# Patient Record
Sex: MALE | URBAN - METROPOLITAN AREA
[De-identification: names, ages, dates, MRNs, and addresses within clinical notes are randomized per-mention and may not be internally consistent; named-entity substitution may affect disease eponyms.]

---

## 2023-06-15 ENCOUNTER — APPOINTMENT (RX ONLY)
Dept: URBAN - METROPOLITAN AREA CLINIC 42 | Facility: CLINIC | Age: 23
Setting detail: DERMATOLOGY
End: 2023-06-15

## 2023-06-15 DIAGNOSIS — L20.89 OTHER ATOPIC DERMATITIS: ICD-10-CM | Status: WORSENING

## 2023-06-15 PROCEDURE — ? PRESCRIPTION

## 2023-06-15 PROCEDURE — ? PRESCRIPTION MEDICATION MANAGEMENT

## 2023-06-15 PROCEDURE — 99204 OFFICE O/P NEW MOD 45 MIN: CPT

## 2023-06-15 PROCEDURE — ? DIAGNOSIS COMMENT

## 2023-06-15 PROCEDURE — ? COUNSELING

## 2023-06-15 RX ORDER — TRIAMCINOLONE ACETONIDE 1 MG/G
CREAM TOPICAL
Qty: 453.6 | Refills: 0 | Status: ERX | COMMUNITY
Start: 2023-06-15

## 2023-06-15 RX ORDER — DESONIDE 0.5 MG/G
CREAM TOPICAL
Qty: 60 | Refills: 0 | Status: ERX | COMMUNITY
Start: 2023-06-15

## 2023-06-15 RX ORDER — HYDROXYZINE HYDROCHLORIDE 10 MG/1
TABLET, FILM COATED ORAL QHS
Qty: 30 | Refills: 1 | Status: ERX | COMMUNITY
Start: 2023-06-15

## 2023-06-15 RX ADMIN — TRIAMCINOLONE ACETONIDE: 1 CREAM TOPICAL at 00:00

## 2023-06-15 RX ADMIN — DESONIDE: 0.5 CREAM TOPICAL at 00:00

## 2023-06-15 RX ADMIN — HYDROXYZINE HYDROCHLORIDE: 10 TABLET, FILM COATED ORAL at 00:00

## 2023-06-15 ASSESSMENT — LOCATION SIMPLE DESCRIPTION DERM
LOCATION SIMPLE: SUPERIOR FOREHEAD
LOCATION SIMPLE: RIGHT UPPER BACK
LOCATION SIMPLE: LEFT BUTTOCK
LOCATION SIMPLE: RIGHT UPPER ARM
LOCATION SIMPLE: RIGHT BUTTOCK
LOCATION SIMPLE: LEFT UPPER ARM

## 2023-06-15 ASSESSMENT — LOCATION DETAILED DESCRIPTION DERM
LOCATION DETAILED: RIGHT SUPERIOR UPPER BACK
LOCATION DETAILED: LEFT BUTTOCK
LOCATION DETAILED: LEFT ANTERIOR PROXIMAL UPPER ARM
LOCATION DETAILED: SUPERIOR MID FOREHEAD
LOCATION DETAILED: RIGHT ANTERIOR PROXIMAL UPPER ARM
LOCATION DETAILED: RIGHT BUTTOCK

## 2023-06-15 ASSESSMENT — BSA ECZEMA: % BODY COVERED IN ECZEMA: 68

## 2023-06-15 ASSESSMENT — LOCATION ZONE DERM
LOCATION ZONE: FACE
LOCATION ZONE: ARM
LOCATION ZONE: TRUNK

## 2023-06-15 NOTE — PROCEDURE: DIAGNOSIS COMMENT
Detail Level: Simple
Render Risk Assessment In Note?: no
Comment: Eczema started 2 years ago.\\nPreviously saw outside dermatologist and was prescribed Desonide ointment and Fluocinonide ointment - some relief.  \\nPt was previously admitted to ER due to severe flaring and infection - rx'd oral steroids, pain medications, and oral antibiotics. Pt reports rash got worse after taking oral steroids. \\nPt reports severe itching, burning, and pain - pt is unable to work due to symptoms.\\nDiscussed rare disorders like ctcl \\n\\nUpon examination, findings are consistent with eczema and not psoriasis. \\n\\nNo family hx of skin cancer. \\nNo hx of childhood eczema. \\nDenies recent illnesses. \\n\\nDisc R/B/SE of Dupixent - pt is currently uninsured.\\nWill discuss case with biologic coordinator. \\n\\nDisc gentle skin care routine. \\nStart Triamcinolone cream, Desonide cream, and Hydroxyzine 10mg tablets.\\n\\nFollow up in 6 weeks.

## 2023-06-15 NOTE — PROCEDURE: PRESCRIPTION MEDICATION MANAGEMENT
Render In Strict Bullet Format?: No
Initiate Treatment: - Triamcinolone acetonide 0.1 % topical cream: Apply to the affected areas of the extremities and trunk BID for 2 weeks, then take a 2 week break. Repeat as needed for flares. Avoid face, groin, and axillae.\\n\\n- Desonide 0.05 % topical cream: Apply a thin layer to affected areas on face, neck, and groin BID x2 weeks, then take a 2 week break. Repeat as needed for flares.\\n\\n- Hydroxyzine HCl 10 mg tablet: Take one pill once daily at bedtime prn itch.
Detail Level: Zone

## 2023-07-27 ENCOUNTER — APPOINTMENT (RX ONLY)
Dept: URBAN - METROPOLITAN AREA CLINIC 42 | Facility: CLINIC | Age: 23
Setting detail: DERMATOLOGY
End: 2023-07-27

## 2023-07-27 DIAGNOSIS — L20.89 OTHER ATOPIC DERMATITIS: ICD-10-CM

## 2023-07-27 PROCEDURE — 99214 OFFICE O/P EST MOD 30 MIN: CPT

## 2023-07-27 PROCEDURE — ? COUNSELING

## 2023-07-27 PROCEDURE — ? PRESCRIPTION MEDICATION MANAGEMENT

## 2023-07-27 PROCEDURE — ? PRESCRIPTION

## 2023-07-27 PROCEDURE — ? DIAGNOSIS COMMENT

## 2023-07-27 RX ORDER — TACROLIMUS 1 MG/G
OINTMENT TOPICAL
Qty: 100 | Refills: 2 | Status: ERX | COMMUNITY
Start: 2023-07-27

## 2023-07-27 RX ORDER — HYDROXYZINE HYDROCHLORIDE 10 MG/1
TABLET, FILM COATED ORAL QHS
Qty: 30 | Refills: 5 | Status: ERX

## 2023-07-27 RX ADMIN — TACROLIMUS: 1 OINTMENT TOPICAL at 00:00

## 2023-07-27 ASSESSMENT — LOCATION SIMPLE DESCRIPTION DERM
LOCATION SIMPLE: LEFT BUTTOCK
LOCATION SIMPLE: RIGHT UPPER ARM
LOCATION SIMPLE: RIGHT UPPER BACK
LOCATION SIMPLE: RIGHT BUTTOCK
LOCATION SIMPLE: SUPERIOR FOREHEAD
LOCATION SIMPLE: LEFT UPPER ARM

## 2023-07-27 ASSESSMENT — LOCATION DETAILED DESCRIPTION DERM
LOCATION DETAILED: SUPERIOR MID FOREHEAD
LOCATION DETAILED: LEFT ANTERIOR PROXIMAL UPPER ARM
LOCATION DETAILED: LEFT BUTTOCK
LOCATION DETAILED: RIGHT BUTTOCK
LOCATION DETAILED: RIGHT SUPERIOR UPPER BACK
LOCATION DETAILED: RIGHT ANTERIOR PROXIMAL UPPER ARM

## 2023-07-27 ASSESSMENT — LOCATION ZONE DERM
LOCATION ZONE: ARM
LOCATION ZONE: FACE
LOCATION ZONE: TRUNK

## 2023-07-27 NOTE — PROCEDURE: PRESCRIPTION MEDICATION MANAGEMENT
Render In Strict Bullet Format?: No
Initiate Treatment: - Triamcinolone acetonide 0.1 % topical cream: Apply to the affected areas of the extremities and trunk BID for 2 weeks, then take a 2 week break. Repeat as needed for flares. Avoid face, groin, and axillae.\\n\\n- Desonide 0.05 % topical cream: Apply a thin layer to affected areas on face, neck, and groin BID x2 weeks, then take a 2 week break. Repeat as needed for flares.\\n\\n- Tacrolimus 0.1 % topical ointment \\nAlternating with topical steroids, apply to affected areas of the body and face BID for 2 weeks. If flare is well controlled, use 1-2x daily as needed.\\n\\n- Hydroxyzine HCl 10 mg tablet: Take one pill once daily at bedtime prn itch.
Detail Level: Zone

## 2023-07-27 NOTE — PROCEDURE: DIAGNOSIS COMMENT
Render Risk Assessment In Note?: no
Detail Level: Simple
Comment: Pt reports improvement with use of Triamcinolone and Desonide. However, pt reports flare recurs 2-3 days after discontinuing topical steroids. \\nDiscussed RBSEs of topical steroids and Protopic. Discussed proper use of topical steroids. \\nDiscussed treatment regimen at length. \\nBriefly discussed performing a biopsy in the future. Pt aware to hold all medications 1-2 weeks prior. \\nPt aware of specialty of pharmacy ([place] Opted for local pharmacy and will call if any issues.\\John questions answered.